# Patient Record
Sex: FEMALE | Race: WHITE | URBAN - METROPOLITAN AREA
[De-identification: names, ages, dates, MRNs, and addresses within clinical notes are randomized per-mention and may not be internally consistent; named-entity substitution may affect disease eponyms.]

---

## 2019-10-21 ENCOUNTER — OFFICE VISIT (OUTPATIENT)
Dept: URBAN - METROPOLITAN AREA CLINIC 29 | Facility: CLINIC | Age: 84
End: 2019-10-21
Payer: MEDICARE

## 2019-10-21 DIAGNOSIS — Z96.1 PRESENCE OF INTRAOCULAR LENS: ICD-10-CM

## 2019-10-21 DIAGNOSIS — H35.363 DRUSEN (DEGENERATIVE) OF MACULA, BILATERAL: Primary | ICD-10-CM

## 2019-10-21 DIAGNOSIS — H43.313 VITREOUS MEMBRANES AND STRANDS, BILATERAL: ICD-10-CM

## 2019-10-21 PROCEDURE — 92014 COMPRE OPH EXAM EST PT 1/>: CPT | Performed by: OPTOMETRIST

## 2019-10-21 ASSESSMENT — INTRAOCULAR PRESSURE
OD: 15
OS: 16

## 2019-10-21 NOTE — IMPRESSION/PLAN
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 Plan: Pt ed re: condition, advised to use [employer] grid for home monitoring of vision changes. Condition not advanced enough for AREDS vitamins at this time, consider if progression in the future.   
RTC 1 year x dilated exam.

## 2019-10-21 NOTE — IMPRESSION/PLAN
Impression: Presence of intraocular lens: Z96.1. OU. Plan: Condition is stable, no further treatment at this time. Pt advised that vision can be improved with glasses, deferred refraction today. RTC prn x refraction.